# Patient Record
Sex: FEMALE | Race: BLACK OR AFRICAN AMERICAN | NOT HISPANIC OR LATINO | URBAN - METROPOLITAN AREA
[De-identification: names, ages, dates, MRNs, and addresses within clinical notes are randomized per-mention and may not be internally consistent; named-entity substitution may affect disease eponyms.]

---
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
## 2019-09-23 ENCOUNTER — EMERGENCY (EMERGENCY)
Facility: HOSPITAL | Age: 19
LOS: 1 days | Discharge: HOME | End: 2019-09-23
Attending: EMERGENCY MEDICINE
Payer: SELF-PAY

## 2019-09-23 VITALS
RESPIRATION RATE: 19 BRPM | OXYGEN SATURATION: 99 % | DIASTOLIC BLOOD PRESSURE: 63 MMHG | SYSTOLIC BLOOD PRESSURE: 125 MMHG | WEIGHT: 201.94 LBS | HEIGHT: 68 IN | HEART RATE: 67 BPM | TEMPERATURE: 98 F

## 2019-09-23 VITALS
DIASTOLIC BLOOD PRESSURE: 70 MMHG | TEMPERATURE: 98 F | SYSTOLIC BLOOD PRESSURE: 128 MMHG | OXYGEN SATURATION: 99 % | HEART RATE: 70 BPM | RESPIRATION RATE: 18 BRPM

## 2019-09-23 LAB
ALBUMIN SERPL ELPH-MCNC: 4 G/DL — SIGNIFICANT CHANGE UP (ref 3.3–5)
ALP SERPL-CCNC: 73 U/L — SIGNIFICANT CHANGE UP (ref 40–120)
ALT FLD-CCNC: 14 U/L — SIGNIFICANT CHANGE UP (ref 10–45)
ANION GAP SERPL CALC-SCNC: 9 MMOL/L — SIGNIFICANT CHANGE UP (ref 5–17)
APPEARANCE UR: CLEAR — SIGNIFICANT CHANGE UP
AST SERPL-CCNC: 17 U/L — SIGNIFICANT CHANGE UP (ref 10–40)
BACTERIA # UR AUTO: NEGATIVE — SIGNIFICANT CHANGE UP
BILIRUB SERPL-MCNC: 0.3 MG/DL — SIGNIFICANT CHANGE UP (ref 0.2–1.2)
BILIRUB UR-MCNC: NEGATIVE — SIGNIFICANT CHANGE UP
BUN SERPL-MCNC: 10 MG/DL — SIGNIFICANT CHANGE UP (ref 7–23)
CALCIUM SERPL-MCNC: 9.9 MG/DL — SIGNIFICANT CHANGE UP (ref 8.4–10.5)
CHLORIDE SERPL-SCNC: 101 MMOL/L — SIGNIFICANT CHANGE UP (ref 96–108)
CO2 SERPL-SCNC: 26 MMOL/L — SIGNIFICANT CHANGE UP (ref 22–31)
COLOR SPEC: SIGNIFICANT CHANGE UP
CREAT SERPL-MCNC: 0.63 MG/DL — SIGNIFICANT CHANGE UP (ref 0.5–1.3)
DIFF PNL FLD: NEGATIVE — SIGNIFICANT CHANGE UP
EOSINOPHIL # BLD AUTO: 0.1 K/UL — SIGNIFICANT CHANGE UP (ref 0–0.5)
EOSINOPHIL NFR BLD AUTO: 1 % — SIGNIFICANT CHANGE UP (ref 0–6)
EPI CELLS # UR: 3 /HPF — SIGNIFICANT CHANGE UP
GAS PNL BLDV: SIGNIFICANT CHANGE UP
GLUCOSE SERPL-MCNC: 81 MG/DL — SIGNIFICANT CHANGE UP (ref 70–99)
GLUCOSE UR QL: NEGATIVE — SIGNIFICANT CHANGE UP
HCG SERPL-ACNC: <2 MIU/ML — SIGNIFICANT CHANGE UP
HCT VFR BLD CALC: 36.7 % — SIGNIFICANT CHANGE UP (ref 34.5–45)
HGB BLD-MCNC: 11.8 G/DL — SIGNIFICANT CHANGE UP (ref 11.5–15.5)
HYALINE CASTS # UR AUTO: 1 /LPF — SIGNIFICANT CHANGE UP (ref 0–2)
KETONES UR-MCNC: NEGATIVE — SIGNIFICANT CHANGE UP
LEUKOCYTE ESTERASE UR-ACNC: ABNORMAL
LIDOCAIN IGE QN: 14 U/L — SIGNIFICANT CHANGE UP (ref 7–60)
LYMPHOCYTES # BLD AUTO: 2.1 K/UL — SIGNIFICANT CHANGE UP (ref 1–3.3)
LYMPHOCYTES # BLD AUTO: 35 % — SIGNIFICANT CHANGE UP (ref 13–44)
MCHC RBC-ENTMCNC: 28.6 PG — SIGNIFICANT CHANGE UP (ref 27–34)
MCHC RBC-ENTMCNC: 32.2 GM/DL — SIGNIFICANT CHANGE UP (ref 32–36)
MCV RBC AUTO: 88.9 FL — SIGNIFICANT CHANGE UP (ref 80–100)
MONOCYTES # BLD AUTO: 0.4 K/UL — SIGNIFICANT CHANGE UP (ref 0–0.9)
MONOCYTES NFR BLD AUTO: 10 % — SIGNIFICANT CHANGE UP (ref 2–14)
NEUTROPHILS # BLD AUTO: 2.8 K/UL — SIGNIFICANT CHANGE UP (ref 1.8–7.4)
NEUTROPHILS NFR BLD AUTO: 50 % — SIGNIFICANT CHANGE UP (ref 43–77)
NITRITE UR-MCNC: NEGATIVE — SIGNIFICANT CHANGE UP
PH UR: 8 — SIGNIFICANT CHANGE UP (ref 5–8)
PLATELET # BLD AUTO: 289 K/UL — SIGNIFICANT CHANGE UP (ref 150–400)
POTASSIUM SERPL-MCNC: 4.2 MMOL/L — SIGNIFICANT CHANGE UP (ref 3.5–5.3)
POTASSIUM SERPL-SCNC: 4.2 MMOL/L — SIGNIFICANT CHANGE UP (ref 3.5–5.3)
PROT SERPL-MCNC: 7.8 G/DL — SIGNIFICANT CHANGE UP (ref 6–8.3)
PROT UR-MCNC: SIGNIFICANT CHANGE UP
RBC # BLD: 4.13 M/UL — SIGNIFICANT CHANGE UP (ref 3.8–5.2)
RBC # FLD: 11.3 % — SIGNIFICANT CHANGE UP (ref 10.3–14.5)
RBC CASTS # UR COMP ASSIST: 4 /HPF — SIGNIFICANT CHANGE UP (ref 0–4)
SODIUM SERPL-SCNC: 136 MMOL/L — SIGNIFICANT CHANGE UP (ref 135–145)
SP GR SPEC: 1.02 — SIGNIFICANT CHANGE UP (ref 1.01–1.02)
UROBILINOGEN FLD QL: NEGATIVE — SIGNIFICANT CHANGE UP
WBC # BLD: 5.4 K/UL — SIGNIFICANT CHANGE UP (ref 3.8–10.5)
WBC # FLD AUTO: 5.4 K/UL — SIGNIFICANT CHANGE UP (ref 3.8–10.5)
WBC UR QL: 6 /HPF — HIGH (ref 0–5)

## 2019-09-23 PROCEDURE — 82330 ASSAY OF CALCIUM: CPT

## 2019-09-23 PROCEDURE — 82803 BLOOD GASES ANY COMBINATION: CPT

## 2019-09-23 PROCEDURE — 83690 ASSAY OF LIPASE: CPT

## 2019-09-23 PROCEDURE — 84132 ASSAY OF SERUM POTASSIUM: CPT

## 2019-09-23 PROCEDURE — 84295 ASSAY OF SERUM SODIUM: CPT

## 2019-09-23 PROCEDURE — 99284 EMERGENCY DEPT VISIT MOD MDM: CPT | Mod: 25

## 2019-09-23 PROCEDURE — 96361 HYDRATE IV INFUSION ADD-ON: CPT

## 2019-09-23 PROCEDURE — 99284 EMERGENCY DEPT VISIT MOD MDM: CPT

## 2019-09-23 PROCEDURE — 81001 URINALYSIS AUTO W/SCOPE: CPT

## 2019-09-23 PROCEDURE — 83605 ASSAY OF LACTIC ACID: CPT

## 2019-09-23 PROCEDURE — 85027 COMPLETE CBC AUTOMATED: CPT

## 2019-09-23 PROCEDURE — 84702 CHORIONIC GONADOTROPIN TEST: CPT

## 2019-09-23 PROCEDURE — 82947 ASSAY GLUCOSE BLOOD QUANT: CPT

## 2019-09-23 PROCEDURE — 80053 COMPREHEN METABOLIC PANEL: CPT

## 2019-09-23 PROCEDURE — 87086 URINE CULTURE/COLONY COUNT: CPT

## 2019-09-23 PROCEDURE — 85014 HEMATOCRIT: CPT

## 2019-09-23 PROCEDURE — 96374 THER/PROPH/DIAG INJ IV PUSH: CPT

## 2019-09-23 PROCEDURE — 82435 ASSAY OF BLOOD CHLORIDE: CPT

## 2019-09-23 RX ORDER — SODIUM CHLORIDE 9 MG/ML
1000 INJECTION INTRAMUSCULAR; INTRAVENOUS; SUBCUTANEOUS ONCE
Refills: 0 | Status: DISCONTINUED | OUTPATIENT
Start: 2019-09-23 | End: 2019-09-23

## 2019-09-23 RX ORDER — KETOROLAC TROMETHAMINE 30 MG/ML
15 SYRINGE (ML) INJECTION ONCE
Refills: 0 | Status: DISCONTINUED | OUTPATIENT
Start: 2019-09-23 | End: 2019-09-23

## 2019-09-23 RX ORDER — SODIUM CHLORIDE 9 MG/ML
1000 INJECTION INTRAMUSCULAR; INTRAVENOUS; SUBCUTANEOUS ONCE
Refills: 0 | Status: COMPLETED | OUTPATIENT
Start: 2019-09-23 | End: 2019-09-23

## 2019-09-23 RX ADMIN — Medication 15 MILLIGRAM(S): at 16:14

## 2019-09-23 RX ADMIN — SODIUM CHLORIDE 1000 MILLILITER(S): 9 INJECTION INTRAMUSCULAR; INTRAVENOUS; SUBCUTANEOUS at 17:22

## 2019-09-23 RX ADMIN — SODIUM CHLORIDE 1000 MILLILITER(S): 9 INJECTION INTRAMUSCULAR; INTRAVENOUS; SUBCUTANEOUS at 14:26

## 2019-09-23 RX ADMIN — Medication 15 MILLIGRAM(S): at 17:22

## 2019-09-23 NOTE — ED ADULT NURSE NOTE - CHPI ED NUR SYMPTOMS NEG
no burning urination/no vomiting/no dysuria/no fever/no nausea/no blood in stool/no chills/no abdominal distension/no hematuria/no diarrhea

## 2019-09-23 NOTE — ED ADULT NURSE NOTE - OBJECTIVE STATEMENT
19 year old female presented to ED with c/o of intermittent bilateral lower abdominal pain radiating to flanks starting at 5pm today. LMP 9/9/19 - 9/12/19. pt denies CP, SOB, nausea/vomiting, numbness/tingling, fever, cough, chills, dizziness, headache, blurred vision, neuro intact. pt a&ox3, lung sounds clear, heart rate regular, abdomen soft nontender nondistended to palp. skin intact. IV in right AC 20G and patent. Will continue to monitor and assess while offering support and reassurance.

## 2019-09-23 NOTE — ED PROVIDER NOTE - NSFOLLOWUPINSTRUCTIONS_ED_ALL_ED_FT
Abdominal Pain    Many things can cause abdominal pain. Many times, abdominal pain is not caused by a disease and will improve without treatment. Your health care provider will do a physical exam to determine if there is a dangerous cause of your pain; blood tests and imaging may help determine the cause of your pain. However, in many cases, no cause may be found and you may need further testing as an outpatient. Monitor your abdominal pain for any changes.     Take ibuprofen with food as needed for pain.    Follow up with gastroenterology.     SEEK IMMEDIATE MEDICAL CARE IF YOU HAVE ANY OF THE FOLLOWING SYMPTOMS: worsening abdominal pain, uncontrollable vomiting, profuse diarrhea, inability to have bowel movements or pass gas, black or bloody stools, fever accompanying chest pain or back pain, or fainting. These symptoms may represent a serious problem that is an emergency. Do not wait to see if the symptoms will go away. Get medical help right away. Call 911 and do not drive yourself to the hospital.

## 2019-09-23 NOTE — ED ADULT TRIAGE NOTE - RESPIRATORY RATE (BREATHS/MIN)
0700-Report from ALLAN Rolon. POC reviewed. Pt requesting to get up to commode. Assisted x2 with transfer. Pt c/o significant back/flank pain.     0945-Pt sitting up in bed for breakfast. Understands POC for day.     1200-Pt continues to call appropriately to use commode. Adjusted for comfort for lunch.    19

## 2019-09-23 NOTE — ED ADULT NURSE NOTE - NSIMPLEMENTINTERV_GEN_ALL_ED
Implemented All Universal Safety Interventions:  [place] to call system. Call bell, personal items and telephone within reach. Instruct patient to call for assistance. Room bathroom lighting operational. Non-slip footwear when patient is off stretcher. Physically safe environment: no spills, clutter or unnecessary equipment. Stretcher in lowest position, wheels locked, appropriate side rails in place.

## 2019-09-23 NOTE — ED PROVIDER NOTE - OBJECTIVE STATEMENT
Attending MD Karimi: 19F with no reported PMH/PSH/Meds/NKDA (allergy to shellfish) presents to the ED with abdominal pain since Thursday 9/19/19.  Reports had sudden onset at around 5pm when she was preparing to leave work, felt like menstrual cramps but not menstruating.  LMP 9/9/19-9/12/19.  Reports pain begins in lower abdomen, spreads to flanks and to proximal thighs.  Reports it is intermittent and is relieved transiently by moving bowels.  Reports feels improved immediately after moving bowels but then returns a few minutes after BMs.  Reports includes rectal cramping.  Reports has taken Ibuprofen with some relief.  Reports has nausea with pain.  Unrelated to po intake.  Denies diarrhea, bloody stools.  Reports has had BMs multiple times a day since Thursday. Denies dysuria, hematuria, change in urinary habits including frequency, urgency. Reports noted urine was cloudy and pale/clear.  Denies previous episode.  Denies fevers, chills, dizziness, weakness.  Denies vaginal discharge, vaginal bleeding.  Reports no anal intercourse.  Reports sexually active with one male partner, fiance, bedside.  Denies history of STI.  Denies tobacco, drugs.  Reports occasional EtOH, none recent.  On exam, NAD, head NCAT, PERRL, FROM at neck, no tenderness to midline palpation, no stepoffs along length of spine, lungs CTAB with good inspiratory effort, +S1S2, no m/r/g, abdomen soft with +BS, NT, ND, no CVAT, moving all extremities with 5/5 strength bilateral upper and lower extremities, good and equal  strength bilaterally Attending MD Karimi: 19F with no reported PMH/PSH/Meds/NKDA (allergy to shellfish) presents to the ED with abdominal pain since Thursday 9/19/19.  Reports had sudden onset at around 5pm when she was preparing to leave work, felt like menstrual cramps but not menstruating.  LMP 9/9/19-9/12/19.  Reports pain begins in lower abdomen, spreads to flanks and to proximal thighs.  Reports it is intermittent and is relieved transiently by moving bowels.  Reports feels improved immediately after moving bowels but then returns a few minutes after BMs.  Reports includes rectal cramping.  Reports has taken Ibuprofen with some relief.  Reports has nausea with pain.  Unrelated to po intake.  Denies diarrhea, bloody stools.  Reports has had BMs multiple times a day since Thursday. Denies dysuria, hematuria, change in urinary habits including frequency, urgency. Reports noted urine was cloudy and pale/clear.  Denies previous episode.  Denies fevers, chills, dizziness, weakness.  Denies vaginal discharge, vaginal bleeding.  Reports no anal intercourse.  Reports sexually active with one male partner, fiance, bedside.  Denies history of STI.  Denies tobacco, drugs.  Reports occasional EtOH, none recent.  On exam, NAD, head NCAT, PERRL, FROM at neck, no tenderness to midline palpation, no stepoffs along length of spine, lungs CTAB with good inspiratory effort, +S1S2, no m/r/g, abdomen soft with +BS, diffuse lower abdominal tenderness, no rebound, no guarding, ND, no CVAT, chaperoned by Dr. Perry, no external genital lesions, os closed, no vaginal discharge, no blood I nthe vault, no CMT, no adnexal tenderness, no hemorrhoids externally, no anal fissures, moving all extremities with 5/5 strength bilateral upper and lower extremities, good and equal  strength bilaterally; A/P: 19F with lower abdominal pain,     will send labs, give IVFs, declines pain control at this time    TO BE COMPLETED Attending MD Karimi: 19F with no reported PMH/PSH/Meds/NKDA (allergy to shellfish) presents to the ED with abdominal pain since Thursday 9/19/19.  Reports had sudden onset at around 5pm when she was preparing to leave work, felt like menstrual cramps but not menstruating.  LMP 9/9/19-9/12/19.  Reports pain begins in lower abdomen, spreads to flanks and to proximal thighs.  Reports it is intermittent and is relieved transiently by moving bowels.  Reports feels improved immediately after moving bowels but then returns a few minutes after BMs.  Reports includes rectal cramping.  Reports has taken Ibuprofen with some relief.  Reports has nausea with pain.  Unrelated to po intake.  Denies diarrhea, bloody stools.  Reports has had BMs multiple times a day since Thursday. Denies dysuria, hematuria, change in urinary habits including frequency, urgency. Reports noted urine was cloudy and pale/clear.  Denies previous episode.  Denies fevers, chills, dizziness, weakness.  Denies vaginal discharge, vaginal bleeding.  Reports no anal intercourse.  Reports sexually active with one male partner, fiance, bedside.  Denies history of STI.  Denies tobacco, drugs.  Reports occasional EtOH, none recent.  On exam, NAD, head NCAT, PERRL, FROM at neck, no tenderness to midline palpation, no stepoffs along length of spine, lungs CTAB with good inspiratory effort, +S1S2, no m/r/g, abdomen soft with +BS, mild diffuse lower abdominal tenderness, no rebound, no guarding, ND, no CVAT, chaperoned by Dr. Perry, no external genital lesions, os closed, no vaginal discharge, no blood in the vault, no CMT, no adnexal tenderness, no hemorrhoids externally, no anal fissures, moving all extremities with 5/5 strength bilateral upper and lower extremities, good and equal  strength bilaterally; A/P: 19F with lower abdominal pain, no red flags, ?IBS, will send labs, give IVFs, declines pain control at this time.  If labs reassuring, will consider discharge for outpatient follow up.

## 2019-09-23 NOTE — ED PROVIDER NOTE - ATTENDING CONTRIBUTION TO CARE
Attending MD Karimi: I personally have seen and examined this patient.  Resident note reviewed and agree on plan of care and except where noted.  See HPI for details.     Seen in Peds 39

## 2019-09-23 NOTE — ED PROVIDER NOTE - PROGRESS NOTE DETAILS
Vicky-PGY1: LM with GI fellow to call back regarding CT protocol to order. Awaiting callback. Attending MD Karimi: Discussed labs, discussed reassuring.  Shared decision making, will defer CT at this time.  Will follow up with GI.  Extensive return to ED instructions given.  Stable for discharge. Follow up instructions given for PMD establishment and GI, importance of follow up emphasized, return to ED parameters reviewed and patient verbalized understanding.  All questions answered, all concerns addressed.

## 2019-09-23 NOTE — ED PROVIDER NOTE - PATIENT PORTAL LINK FT
You can access the FollowMyHealth Patient Portal offered by [facility] by registering at the following website: [email or web address]. By joining [facility]’s FollowMyHealth portal, you will also be able to view your health information using other applications (apps) compatible with our system.

## 2019-09-24 LAB
CULTURE RESULTS: NO GROWTH — SIGNIFICANT CHANGE UP
SPECIMEN SOURCE: SIGNIFICANT CHANGE UP